# Patient Record
Sex: FEMALE | Race: WHITE | NOT HISPANIC OR LATINO | Employment: UNEMPLOYED | URBAN - NONMETROPOLITAN AREA
[De-identification: names, ages, dates, MRNs, and addresses within clinical notes are randomized per-mention and may not be internally consistent; named-entity substitution may affect disease eponyms.]

---
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
## 2022-05-12 ENCOUNTER — OFFICE VISIT (OUTPATIENT)
Dept: FAMILY MEDICINE CLINIC | Age: 22
End: 2022-05-12

## 2022-05-12 VITALS
HEART RATE: 114 BPM | HEIGHT: 68 IN | WEIGHT: 136.6 LBS | DIASTOLIC BLOOD PRESSURE: 98 MMHG | BODY MASS INDEX: 20.7 KG/M2 | SYSTOLIC BLOOD PRESSURE: 142 MMHG

## 2022-05-12 DIAGNOSIS — R11.2 NAUSEA AND VOMITING, UNSPECIFIED VOMITING TYPE: Primary | ICD-10-CM

## 2022-05-12 PROCEDURE — 99213 OFFICE O/P EST LOW 20 MIN: CPT | Performed by: NURSE PRACTITIONER

## 2022-05-12 RX ORDER — ONDANSETRON 4 MG/1
4 TABLET, ORALLY DISINTEGRATING ORAL EVERY 8 HOURS PRN
Qty: 30 TABLET | Refills: 0 | Status: SHIPPED | OUTPATIENT
Start: 2022-05-12 | End: 2022-08-13 | Stop reason: SDUPTHER

## 2022-05-12 NOTE — PROGRESS NOTES
"Anayeli Lua presents to [facility] with complaint of  Nausea (Lack of appetite x 2 months)    SUBJECTIVE  History of Present Illness    The patient presents today with nausea and decreased appetite. She says she feels full after just taking a few bites. She admits she is not sure if this is subconscious or not, when she has these experiences. She also becomes nauseated when she is having a BM. She says the nausea occurs however before she strains to have BM. She says that she was able to keep down fettuccine jesse yesterday, and a protein bar today. She says she has not been able to eat regular meals for the nausea.     She does say she had diarrhea 2 weeks ago, but none since then. She denies fever, chills, HA, dizziness, syncope, CP, SOA, GERD symptoms, and abdominal pain.    Of note, she has lost 14 lbs since March 2021.     She does express she had possible miscarriage 2 months ago and does not know is this is playing into her nauseasness. She mentions she has started taking multivitamin since she has not been able to eat normally.     The patient says she does have hx of eating disorder that was never diagnosed 3 years ago. She said she would restrict her food/certain foods and would count calories almost obsessively. She says she got down to 110 lbs.     OBJECTIVE  Vital Signs:   /98 (BP Location: Left arm, Patient Position: Sitting)   Pulse 114   Ht 172.7 cm (68\")   Wt 62 kg (136 lb 9.6 oz)   BMI 20.77 kg/m²       Physical Exam  Vitals reviewed.   Constitutional:       General: She is not in acute distress.     Appearance: Normal appearance. She is not ill-appearing.   HENT:      Head: Normocephalic and atraumatic.      Nose: Nose normal.      Mouth/Throat:      Mouth: Mucous membranes are moist.      Pharynx: Oropharynx is clear.   Cardiovascular:      Rate and Rhythm: Normal rate and regular rhythm.      Pulses: Normal pulses.      Heart sounds: Normal heart " sounds.   Pulmonary:      Effort: Pulmonary effort is normal.      Breath sounds: Normal breath sounds.   Abdominal:      General: Bowel sounds are normal.      Palpations: Abdomen is soft. There is no hepatomegaly, splenomegaly or mass.      Tenderness: There is no abdominal tenderness. There is no guarding or rebound. Negative signs include Sosa's sign and McBurney's sign.      Hernia: No hernia is present.   Musculoskeletal:      Cervical back: Neck supple.   Skin:     General: Skin is warm and dry.      Capillary Refill: Capillary refill takes less than 2 seconds.   Neurological:      General: No focal deficit present.      Mental Status: She is alert and oriented to person, place, and time. Mental status is at baseline.   Psychiatric:         Mood and Affect: Mood is anxious.         Speech: Speech is rapid and pressured.         Behavior: Behavior normal.         Judgment: Judgment normal.          ASSESSMENT AND PLAN:  Diagnoses and all orders for this visit:    1. Nausea and vomiting, unspecified vomiting type (Primary)  -     ondansetron ODT (ZOFRAN-ODT) 4 MG disintegrating tablet; Place 1 tablet on the tongue Every 8 (Eight) Hours As Needed for Nausea or Vomiting.  Dispense: 30 tablet; Refill: 0    Discussed with the patient possible causes for her symptoms. I have high suspicion that this may be psychogenic, given her recent stressors and history of eating issues. She does not wish to do any labs today. She would like to try Zofran first along with BRAT diet. She was told to take sips of sprite/ginger ale and water-avoid caffeine, juices, spicy/seasoned foods. She will follow up with PCP in 1 month, if no improvement, she may need further workup to rule out causes.      Follow Up   Return in about 1 month (around 6/12/2022). Patient to notify office with any acute concerns or issues.  Patient verbalizes understanding, agrees with plan of care and has no further questions upon discharge.     Patient was  given instructions and counseling regarding her condition or for health maintenance advice. Please see specific information pulled into the AVS if appropriate.

## 2022-06-20 ENCOUNTER — OFFICE VISIT (OUTPATIENT)
Dept: FAMILY MEDICINE CLINIC | Age: 22
End: 2022-06-20

## 2022-06-20 VITALS
HEART RATE: 97 BPM | SYSTOLIC BLOOD PRESSURE: 130 MMHG | WEIGHT: 132.6 LBS | TEMPERATURE: 98.8 F | DIASTOLIC BLOOD PRESSURE: 80 MMHG | HEIGHT: 68 IN | BODY MASS INDEX: 20.1 KG/M2

## 2022-06-20 DIAGNOSIS — K21.9 GASTROESOPHAGEAL REFLUX DISEASE, UNSPECIFIED WHETHER ESOPHAGITIS PRESENT: ICD-10-CM

## 2022-06-20 DIAGNOSIS — R55 SYNCOPE, UNSPECIFIED SYNCOPE TYPE: ICD-10-CM

## 2022-06-20 DIAGNOSIS — F41.9 ANXIETY: ICD-10-CM

## 2022-06-20 DIAGNOSIS — R63.0 DECREASED APPETITE: ICD-10-CM

## 2022-06-20 DIAGNOSIS — R11.2 NAUSEA AND VOMITING, UNSPECIFIED VOMITING TYPE: Primary | ICD-10-CM

## 2022-06-20 PROCEDURE — 99214 OFFICE O/P EST MOD 30 MIN: CPT | Performed by: NURSE PRACTITIONER

## 2022-06-20 RX ORDER — MIRTAZAPINE 15 MG/1
15 TABLET, FILM COATED ORAL NIGHTLY
Qty: 30 TABLET | Refills: 1 | Status: SHIPPED | OUTPATIENT
Start: 2022-06-20 | End: 2022-11-04

## 2022-06-20 RX ORDER — PANTOPRAZOLE SODIUM 40 MG/1
40 TABLET, DELAYED RELEASE ORAL DAILY
Qty: 90 TABLET | Refills: 1 | Status: SHIPPED | OUTPATIENT
Start: 2022-06-20 | End: 2022-11-04

## 2022-06-20 NOTE — PROGRESS NOTES
"Chief Complaint  Nausea (1 month f/u)    Subjective          Anayeli Lua presents to [facility]  For follow-up regarding nausea.  She was seen in this office approximately 1 month ago by JOSE Ewing.  She was given Zofran.  She states that this medication has helped but she still does not have an appetite.  She feels as if she will pass out at times.  States she is having some symptoms that could be heartburn.  She has abdominal pain when she is nauseous as well.  She states in high school she did limit her calorie intake severely to promote weight loss.  She is not trying to do this now, she is actually trying to gain weight.  She states that the food texture, smell will make her nauseous and then she actually has abdominal pain after she eats.  Denies bulimia.    Patient states she is also concerned about having anxiety.  She has not taken anything for this in the past.  Denies depression.  Denies suicidal ideation.  Objective   Vital Signs:   Vitals:    06/20/22 1514 06/20/22 1516   BP: 111/91 130/80   BP Location: Right arm Left arm   Patient Position: Sitting Sitting   Cuff Size: Adult Adult   Pulse: 91 97   Temp: 98.8 °F (37.1 °C)    TempSrc: Oral    Weight: 60.1 kg (132 lb 9.6 oz)    Height: 172.7 cm (67.99\")        Physical Exam  Vitals reviewed.   Constitutional:       General: She is not in acute distress.     Appearance: Normal appearance. She is well-developed.   HENT:      Head: Normocephalic and atraumatic.   Eyes:      Conjunctiva/sclera: Conjunctivae normal.      Pupils: Pupils are equal, round, and reactive to light.   Cardiovascular:      Rate and Rhythm: Normal rate and regular rhythm.      Heart sounds: Normal heart sounds. No murmur heard.  Pulmonary:      Effort: Pulmonary effort is normal. No respiratory distress.      Breath sounds: Normal breath sounds.   Abdominal:      General: Bowel sounds are normal. There is no distension.      Palpations: " Abdomen is soft. There is no mass.      Tenderness: There is abdominal tenderness.      Hernia: No hernia is present.      Comments: Diffuse abdominal tenderness noted.   Skin:     General: Skin is warm and dry.   Neurological:      Mental Status: She is alert and oriented to person, place, and time.   Psychiatric:         Mood and Affect: Mood and affect normal.         Behavior: Behavior normal.         Thought Content: Thought content normal.         Judgment: Judgment normal.          Result Review :   The following data was reviewed by: JOSE Alonso on 06/20/2022:  Office Visit with Janet Dukes APRN (05/12/2022)           Assessment and Plan    Diagnoses and all orders for this visit:    1. Nausea and vomiting, unspecified vomiting type (Primary)  Assessment & Plan:  Continue Zofran as needed.  Will notify patient of CT results.  Sending to gastro.    Orders:  -     Ambulatory Referral to Gastroenterology  -     CT Abdomen Pelvis With & Without Contrast; Future    2. Syncope, unspecified syncope type  Assessment & Plan:  Will notify patient of results and treat accordingly.  Make sure to eat small meals throughout the day.  Increase water intake.    Orders:  -     CBC Auto Differential; Future  -     Comprehensive Metabolic Panel; Future  -     TSH Rfx On Abnormal To Free T4; Future    3. Gastroesophageal reflux disease, unspecified whether esophagitis present  Assessment & Plan:  Patient to start Protonix daily.    Orders:  -     pantoprazole (Protonix) 40 MG EC tablet; Take 1 tablet by mouth Daily.  Dispense: 90 tablet; Refill: 1  -     Ambulatory Referral to Gastroenterology    4. Decreased appetite  Assessment & Plan:  Starting patient on Remeron every night.  Residual side effects of medication discussed.  Hopefully this will also help with anxiety.  Patient declines counseling at this time.  She does not believe that she has an eating disorder or that her symptoms are related to her  past.    Orders:  -     mirtazapine (Remeron) 15 MG tablet; Take 1 tablet by mouth Every Night.  Dispense: 30 tablet; Refill: 1    5. Anxiety  Assessment & Plan:  Starting Remeron.  Potential side effects medication discussed.  Follow-up in 6 weeks.    Patient to notify office with any acute concerns or issues.  Patient verbalizes understanding, agrees with plan of care and has no further questions upon discharge.    Please note that portions of this note were completed with a voice recognition program.    Follow Up    Return if symptoms worsen or fail to improve.  Patient was given instructions and counseling regarding her condition or for health maintenance advice. Please see specific information pulled into the AVS if appropriate.   Answers for HPI/ROS submitted by the patient on 6/18/2022  Please describe your symptoms.: Constant nausea and low appetite. Had another near fainting spell Saturday night which seems to have been triggered by a panic attack like feeling caused by the back pain. Afterwards I felt like crying but didn’t know why. Zofran helps but causes constipation, all other bowel movements are diarrhea.  Have you had these symptoms before?: Yes  How long have you been having these symptoms?: Greater than 2 weeks  Please list any medications you are currently taking for this condition.: Zofran  Please describe any probable cause for these symptoms. : Possibly anxiety.  What is the primary reason for your visit?: Other

## 2022-06-22 PROBLEM — R63.0 DECREASED APPETITE: Status: ACTIVE | Noted: 2022-06-22

## 2022-06-22 PROBLEM — F41.9 ANXIETY: Status: ACTIVE | Noted: 2022-06-22

## 2022-06-22 NOTE — ASSESSMENT & PLAN NOTE
Will notify patient of results and treat accordingly.  Make sure to eat small meals throughout the day.  Increase water intake.

## 2022-06-22 NOTE — ASSESSMENT & PLAN NOTE
Starting patient on Remeron every night.  Residual side effects of medication discussed.  Hopefully this will also help with anxiety.  Patient declines counseling at this time.  She does not believe that she has an eating disorder or that her symptoms are related to her past.

## 2022-07-06 ENCOUNTER — PATIENT MESSAGE (OUTPATIENT)
Dept: FAMILY MEDICINE CLINIC | Age: 22
End: 2022-07-06

## 2022-07-13 ENCOUNTER — TELEPHONE (OUTPATIENT)
Dept: FAMILY MEDICINE CLINIC | Age: 22
End: 2022-07-13

## 2022-07-28 ENCOUNTER — HOSPITAL ENCOUNTER (OUTPATIENT)
Dept: CT IMAGING | Facility: HOSPITAL | Age: 22
Discharge: HOME | End: 2022-07-28
Admitting: NURSE PRACTITIONER

## 2022-07-28 DIAGNOSIS — R11.2 NAUSEA AND VOMITING, UNSPECIFIED VOMITING TYPE: ICD-10-CM

## 2022-07-28 PROCEDURE — 0 DIATRIZOATE MEGLUMINE & SODIUM PER 1 ML: Performed by: NURSE PRACTITIONER

## 2022-07-28 PROCEDURE — 25010000002 IOPAMIDOL 61 % SOLUTION: Performed by: NURSE PRACTITIONER

## 2022-07-28 PROCEDURE — 74177 CT ABD & PELVIS W/CONTRAST: CPT

## 2022-07-28 RX ADMIN — DIATRIZOATE MEGLUMINE AND DIATRIZOATE SODIUM 30 ML: 660; 100 LIQUID ORAL; RECTAL at 16:50

## 2022-07-28 RX ADMIN — IOPAMIDOL 100 ML: 612 INJECTION, SOLUTION INTRAVENOUS at 17:50

## 2022-08-13 DIAGNOSIS — R11.2 NAUSEA AND VOMITING, UNSPECIFIED VOMITING TYPE: ICD-10-CM

## 2022-08-15 RX ORDER — ONDANSETRON 4 MG/1
4 TABLET, ORALLY DISINTEGRATING ORAL EVERY 8 HOURS PRN
Qty: 30 TABLET | Refills: 0 | Status: SHIPPED | OUTPATIENT
Start: 2022-08-15 | End: 2022-11-21

## 2022-10-25 ENCOUNTER — PATIENT MESSAGE (OUTPATIENT)
Dept: FAMILY MEDICINE CLINIC | Age: 22
End: 2022-10-25

## 2022-10-27 NOTE — TELEPHONE ENCOUNTER
From: Anayeli Lua  To: JOSE Alonso  Sent: 10/25/2022 7:39 PM EDT  Subject: Question about Ovarian Cysts    Hi,  Since my last visit and CT scan, I have continued to  lose weight due to lack of appetite and have been  experiencing pain in my lower back and cramps  outside of my period that are increasing in  frequency. I am going to work with a nutritionist  on campus to address the weight loss but I am still  unsure if it will help. After doing some reading and  speaking to a family friend, I am concerned about  ovarian cancer. Do the cysts give me a  predisposition to ovarian cancer or is this  something I should not be concerned about?

## 2022-11-04 ENCOUNTER — OFFICE VISIT (OUTPATIENT)
Dept: FAMILY MEDICINE CLINIC | Age: 22
End: 2022-11-04

## 2022-11-04 VITALS
OXYGEN SATURATION: 100 % | BODY MASS INDEX: 18.52 KG/M2 | SYSTOLIC BLOOD PRESSURE: 125 MMHG | HEART RATE: 99 BPM | DIASTOLIC BLOOD PRESSURE: 80 MMHG | WEIGHT: 122.2 LBS | HEIGHT: 68 IN | TEMPERATURE: 98.5 F

## 2022-11-04 DIAGNOSIS — N93.9 VAGINAL BLEEDING: ICD-10-CM

## 2022-11-04 DIAGNOSIS — M54.50 ACUTE LOW BACK PAIN, UNSPECIFIED BACK PAIN LATERALITY, UNSPECIFIED WHETHER SCIATICA PRESENT: ICD-10-CM

## 2022-11-04 DIAGNOSIS — R10.9 ABDOMINAL CRAMPING: ICD-10-CM

## 2022-11-04 DIAGNOSIS — M54.50 ACUTE LOW BACK PAIN, UNSPECIFIED BACK PAIN LATERALITY, UNSPECIFIED WHETHER SCIATICA PRESENT: Primary | ICD-10-CM

## 2022-11-04 LAB
B-HCG UR QL: NEGATIVE
EXPIRATION DATE: NORMAL
INTERNAL NEGATIVE CONTROL: NORMAL
INTERNAL POSITIVE CONTROL: NORMAL
Lab: NORMAL

## 2022-11-04 PROCEDURE — 81025 URINE PREGNANCY TEST: CPT

## 2022-11-04 PROCEDURE — 99213 OFFICE O/P EST LOW 20 MIN: CPT

## 2022-11-04 NOTE — PROGRESS NOTES
"Subjective     CHIEF COMPLAINT    Chief Complaint   Patient presents with   • Back Pain     Lower back pain and Vaginal bleeding. Patient states she \"might be having a miscarriage.\"     History of Present Illness  Patient is a 22-year-old female who presents to the clinic today with complaints of lower back pain and vaginal bleeding.  Her symptoms began last night.  She also endorses abdominal cramping.  She does note some clots in the vaginal bleeding this morning.  Her concern is that she may be having a miscarriage as her symptoms feel similar to a previous miscarriage.  First day of her last menstrual period was 10-8-2022.  She is not confirmed to be pregnant but is having unprotected sex.  She does not follow routinely with OB.  She does endorse some nausea but denies vomiting.    Review of Systems   Constitutional: Negative for chills and fever.   Gastrointestinal: Positive for abdominal pain and nausea. Negative for vomiting.   Genitourinary: Positive for vaginal bleeding. Negative for dysuria.   Musculoskeletal: Positive for back pain.     History reviewed. No pertinent surgical history.      No Known Allergies      Current Outpatient Medications on File Prior to Visit   Medication Sig Dispense Refill   • ondansetron ODT (ZOFRAN-ODT) 4 MG disintegrating tablet Place 1 tablet on the tongue Every 8 (Eight) Hours As Needed for Nausea or Vomiting. 30 tablet 0   • [DISCONTINUED] mirtazapine (Remeron) 15 MG tablet Take 1 tablet by mouth Every Night. 30 tablet 1   • [DISCONTINUED] pantoprazole (Protonix) 40 MG EC tablet Take 1 tablet by mouth Daily. 90 tablet 1     No current facility-administered medications on file prior to visit.     /80 (BP Location: Left arm, Patient Position: Sitting, Cuff Size: Adult)   Pulse 99   Temp 98.5 °F (36.9 °C) (Oral)   Ht 172.7 cm (67.99\")   Wt 55.4 kg (122 lb 3.2 oz)   SpO2 100%   BMI 18.59 kg/m²     Objective     Physical Exam  Vitals and nursing note reviewed. "   Constitutional:       General: She is not in acute distress.     Appearance: Normal appearance. She is not ill-appearing.   Cardiovascular:      Rate and Rhythm: Normal rate and regular rhythm.      Heart sounds: Normal heart sounds. No murmur heard.  Pulmonary:      Effort: Pulmonary effort is normal. No respiratory distress.      Breath sounds: Normal breath sounds. No wheezing or rhonchi.   Abdominal:      General: Bowel sounds are normal. There is no distension.      Palpations: Abdomen is soft.      Tenderness: There is abdominal tenderness in the suprapubic area. There is no right CVA tenderness, left CVA tenderness, guarding or rebound.   Neurological:      General: No focal deficit present.      Mental Status: She is alert and oriented to person, place, and time.   Psychiatric:         Mood and Affect: Affect normal. Mood is anxious.         Speech: Speech normal.          Results for orders placed or performed in visit on 11/04/22   POCT pregnancy, urine    Specimen: Urine   Result Value Ref Range    HCG, Urine, QL Negative Negative    Lot Number [id number]     Internal Positive Control Passed Positive, Passed    Internal Negative Control Passed Negative, Passed    Expiration Date 12,312,023           Diagnoses and all orders for this visit:    1. Acute low back pain, unspecified back pain laterality, unspecified whether sciatica present (Primary)  -     Cancel: Urinalysis With Microscopic - Urine, Clean Catch; Future  -     POCT pregnancy, urine    2. Vaginal bleeding    3. Abdominal cramping      UA cancelled, entered in error.     Given patients complaints and concerns, she will be going to ER for further evaluation.  Declines EMS. Her boyfriend is with her and will drive her to the ER. Report called to [facility]. Patient voiced understanding of all instructions and had no further questions at this time.

## 2022-11-21 ENCOUNTER — OFFICE VISIT (OUTPATIENT)
Dept: FAMILY MEDICINE CLINIC | Age: 22
End: 2022-11-21

## 2022-11-21 VITALS
HEIGHT: 68 IN | HEART RATE: 99 BPM | SYSTOLIC BLOOD PRESSURE: 111 MMHG | DIASTOLIC BLOOD PRESSURE: 73 MMHG | WEIGHT: 123.2 LBS | TEMPERATURE: 98 F | BODY MASS INDEX: 18.67 KG/M2

## 2022-11-21 DIAGNOSIS — N63.0 BREAST NODULE: ICD-10-CM

## 2022-11-21 DIAGNOSIS — N64.4 MASTALGIA IN FEMALE: Primary | ICD-10-CM

## 2022-11-21 PROCEDURE — 99213 OFFICE O/P EST LOW 20 MIN: CPT | Performed by: NURSE PRACTITIONER

## 2022-11-21 NOTE — PROGRESS NOTES
"Chief Complaint  Breast Mass (Left, \"I have had breast pain for a month but noticed it swelling about 2 weeks ago\")    Subjective          Anayeli Lua presents to [facility]  Complaining of left upper outer and underarm pain x1 month.  She states that she noticed it swelling approximately 2 weeks ago.  She has not noticed a correlation between her cycle.  She also noticed a small darker lesion on the underside of her right breast.  Denies family history of breast cancer.    Objective   Vital Signs:   Vitals:    11/21/22 1311   BP: 111/73   BP Location: Left arm   Patient Position: Sitting   Pulse: 99   Temp: 98 °F (36.7 °C)   TempSrc: Oral   Weight: 55.9 kg (123 lb 3.2 oz)   Height: 172.7 cm (67.99\")       Physical Exam  Vitals reviewed.   Constitutional:       General: She is not in acute distress.     Appearance: Normal appearance. She is well-developed.   Pulmonary:      Effort: Pulmonary effort is normal. No respiratory distress.   Chest:   Breasts:     Breasts are symmetrical.      Right: No inverted nipple or nipple discharge.      Left: No inverted nipple or nipple discharge.       Lymphadenopathy:      Upper Body:      Right upper body: Axillary adenopathy present.      Left upper body: No axillary adenopathy.   Skin:     General: Skin is warm and dry.   Neurological:      Mental Status: She is alert and oriented to person, place, and time.   Psychiatric:         Mood and Affect: Mood and affect normal.         Behavior: Behavior normal.         Thought Content: Thought content normal.         Judgment: Judgment normal.          Result Review :              Assessment and Plan    Diagnoses and all orders for this visit:    1. Mastalgia in female (Primary)  -     Mammo Diagnostic Digital Tomosynthesis Bilateral With CAD; Future  -     US Breast Left Limited; Future    2. Breast nodule  -     Mammo Diagnostic Digital Tomosynthesis Bilateral With CAD; Future  -     US Breast " Right Limited; Future    Will notify patient of results and treat accordingly.  Patient to notify office with any acute concerns or issues.  Patient verbalizes understanding, agrees with plan of care and has no further questions upon discharge.    Please note that portions of this note were completed with a voice recognition program.    Follow Up    Return if symptoms worsen or fail to improve.  Patient was given instructions and counseling regarding her condition or for health maintenance advice. Please see specific information pulled into the AVS if appropriate.

## 2022-12-05 ENCOUNTER — HOSPITAL ENCOUNTER (OUTPATIENT)
Dept: MAMMOGRAPHY | Facility: HOSPITAL | Age: 22
Discharge: HOME | End: 2022-12-05

## 2022-12-05 ENCOUNTER — ANCILLARY ORDERS (OUTPATIENT)
Dept: FAMILY MEDICINE CLINIC | Age: 22
End: 2022-12-05

## 2022-12-05 ENCOUNTER — HOSPITAL ENCOUNTER (OUTPATIENT)
Dept: ULTRASOUND IMAGING | Facility: HOSPITAL | Age: 22
Discharge: HOME | End: 2022-12-05

## 2022-12-05 ENCOUNTER — APPOINTMENT (OUTPATIENT)
Dept: ULTRASOUND IMAGING | Facility: HOSPITAL | Age: 22
End: 2022-12-05

## 2022-12-05 DIAGNOSIS — L72.3 SEBACEOUS CYST OF AXILLA: Primary | ICD-10-CM

## 2022-12-05 DIAGNOSIS — N64.4 MASTALGIA IN FEMALE: ICD-10-CM

## 2022-12-05 DIAGNOSIS — N63.0 BREAST NODULE: ICD-10-CM

## 2022-12-05 PROCEDURE — 76642 ULTRASOUND BREAST LIMITED: CPT

## 2022-12-07 ENCOUNTER — OFFICE VISIT (OUTPATIENT)
Dept: FAMILY MEDICINE CLINIC | Age: 22
End: 2022-12-07

## 2022-12-07 VITALS
TEMPERATURE: 98.4 F | HEIGHT: 68 IN | WEIGHT: 125 LBS | HEART RATE: 83 BPM | BODY MASS INDEX: 18.94 KG/M2 | OXYGEN SATURATION: 98 % | DIASTOLIC BLOOD PRESSURE: 64 MMHG | SYSTOLIC BLOOD PRESSURE: 105 MMHG

## 2022-12-07 DIAGNOSIS — R10.84 GENERALIZED ABDOMINAL PAIN: Primary | ICD-10-CM

## 2022-12-07 DIAGNOSIS — K30 INDIGESTION: ICD-10-CM

## 2022-12-07 DIAGNOSIS — R11.0 NAUSEA: ICD-10-CM

## 2022-12-07 DIAGNOSIS — R30.0 DYSURIA: ICD-10-CM

## 2022-12-07 LAB
BILIRUB BLD-MCNC: NEGATIVE MG/DL
CLARITY, POC: ABNORMAL
COLOR UR: ABNORMAL
EXPIRATION DATE: ABNORMAL
GLUCOSE UR STRIP-MCNC: NEGATIVE MG/DL
KETONES UR QL: NEGATIVE
LEUKOCYTE EST, POC: NEGATIVE
Lab: ABNORMAL
NITRITE UR-MCNC: NEGATIVE MG/ML
PH UR: 8 [PH] (ref 5–8)
PROT UR STRIP-MCNC: ABNORMAL MG/DL
RBC # UR STRIP: ABNORMAL /UL
SP GR UR: 1.02 (ref 1–1.03)
UROBILINOGEN UR QL: ABNORMAL

## 2022-12-07 PROCEDURE — 81003 URINALYSIS AUTO W/O SCOPE: CPT | Performed by: NURSE PRACTITIONER

## 2022-12-07 PROCEDURE — 99213 OFFICE O/P EST LOW 20 MIN: CPT | Performed by: NURSE PRACTITIONER

## 2022-12-07 RX ORDER — OMEPRAZOLE 20 MG/1
20 CAPSULE, DELAYED RELEASE ORAL DAILY
Qty: 20 CAPSULE | Refills: 0 | Status: SHIPPED | OUTPATIENT
Start: 2022-12-07 | End: 2022-12-19

## 2022-12-07 RX ORDER — ONDANSETRON 4 MG/1
4 TABLET, ORALLY DISINTEGRATING ORAL EVERY 8 HOURS PRN
Qty: 15 TABLET | Refills: 1 | Status: SHIPPED | OUTPATIENT
Start: 2022-12-07

## 2022-12-07 RX ORDER — SUCRALFATE 1 G/1
1 TABLET ORAL 4 TIMES DAILY
Qty: 40 TABLET | Refills: 0 | Status: SHIPPED | OUTPATIENT
Start: 2022-12-07 | End: 2023-02-13

## 2022-12-07 NOTE — PROGRESS NOTES
"Chief Complaint  Abdominal Pain (Stomach pain, could hardly walk around, started yesterday and is getting better today )    Subjective        Anayeli Lua presents to [facility]  Abdominal Pain  This is a recurrent problem. The current episode started yesterday. The problem occurs intermittently. The problem has been gradually improving since onset. The pain is located in the generalized abdominal region. The pain is at a severity of 7/10. The quality of the pain is described as burning and sharp. The pain radiates to the back. Associated symptoms include diarrhea, dysuria and nausea. Pertinent negatives include no constipation, fever or frequency. The symptoms are relieved by standing. Past treatments include nothing. The treatment provided mild relief.   LMP 12/5/2022    Objective   Vital Signs:  /64 (BP Location: Right arm, Patient Position: Sitting)   Pulse 83   Temp 98.4 °F (36.9 °C) (Oral)   Ht 172.7 cm (67.99\")   Wt 56.7 kg (125 lb)   SpO2 98%   BMI 19.01 kg/m²   Estimated body mass index is 19.01 kg/m² as calculated from the following:    Height as of this encounter: 172.7 cm (67.99\").    Weight as of this encounter: 56.7 kg (125 lb).    BMI is within normal parameters. No other follow-up for BMI required.      Physical Exam  HENT:      Head: Normocephalic.   Cardiovascular:      Rate and Rhythm: Normal rate.   Pulmonary:      Effort: Pulmonary effort is normal.   Abdominal:      General: Abdomen is flat. Bowel sounds are normal.      Palpations: Abdomen is soft.      Tenderness: There is no abdominal tenderness. There is no rebound.   Skin:     General: Skin is warm and dry.   Neurological:      Mental Status: She is alert and oriented to person, place, and time.   Psychiatric:         Mood and Affect: Mood normal.        Result Review :                 Results for orders placed or performed in visit on 12/07/22   POCT urinalysis dipstick, automated    Specimen: " Urine   Result Value Ref Range    Color Dark Yellow Yellow, Straw, Dark Yellow, Misa    Clarity, UA Slightly Cloudy (A) Clear    Specific Gravity  1.020 1.005 - 1.030    pH, Urine 8.0 5.0 - 8.0    Leukocytes Negative Negative    Nitrite, UA Negative Negative    Protein,  mg/dL (A) Negative mg/dL    Glucose, UA Negative Negative mg/dL    Ketones, UA Negative Negative    Urobilinogen, UA 0.2 E.U./dL Normal, 0.2 E.U./dL    Bilirubin Negative Negative    Blood, UA Moderate (A) Negative    Lot Number [id number]     Expiration Date 8.30.2023        Assessment and Plan   Diagnoses and all orders for this visit:    1. Generalized abdominal pain (Primary)  Comments:  Improved today. Will treat for mild gastritis, follow up in one week if no improvement for GI referral and possible scope  Orders:  -     omeprazole (priLOSEC) 20 MG capsule; Take 1 capsule by mouth Daily.  Dispense: 20 capsule; Refill: 0  -     sucralfate (Carafate) 1 g tablet; Take 1 tablet by mouth 4 (Four) Times a Day.  Dispense: 40 tablet; Refill: 0    2. Nausea  -     ondansetron ODT (ZOFRAN-ODT) 4 MG disintegrating tablet; Place 1 tablet on the tongue Every 8 (Eight) Hours As Needed for Nausea or Vomiting.  Dispense: 15 tablet; Refill: 1    3. Indigestion  -     omeprazole (priLOSEC) 20 MG capsule; Take 1 capsule by mouth Daily.  Dispense: 20 capsule; Refill: 0  -     H. Pylori Breath Test - Breath, Lung; Future    4. Dysuria  -     POCT urinalysis dipstick, automated             Follow Up {Instructions Charge Capture  Follow-up Communications :23}  Return if symptoms worsen or fail to improve.  Patient was given instructions and counseling regarding her condition or for health maintenance advice. Please see specific information pulled into the AVS if appropriate.

## 2022-12-15 ENCOUNTER — TELEPHONE (OUTPATIENT)
Dept: FAMILY MEDICINE CLINIC | Age: 22
End: 2022-12-15

## 2022-12-19 DIAGNOSIS — K30 INDIGESTION: Primary | ICD-10-CM

## 2022-12-19 RX ORDER — PANTOPRAZOLE SODIUM 40 MG/1
40 TABLET, DELAYED RELEASE ORAL DAILY
Qty: 20 TABLET | Refills: 0 | Status: SHIPPED | OUTPATIENT
Start: 2022-12-19 | End: 2023-02-13

## 2022-12-24 DIAGNOSIS — R10.84 GENERALIZED ABDOMINAL PAIN: ICD-10-CM

## 2022-12-27 RX ORDER — SUCRALFATE 1 G/1
TABLET ORAL
Qty: 40 TABLET | Refills: 0 | OUTPATIENT
Start: 2022-12-27

## 2023-01-04 ENCOUNTER — TELEPHONE (OUTPATIENT)
Dept: FAMILY MEDICINE CLINIC | Age: 23
End: 2023-01-04
Payer: COMMERCIAL

## 2023-01-04 NOTE — TELEPHONE ENCOUNTER
Received fax from pharmacy stating pantoprazole 40mg tablets not covered by insurance, per [place] check w/ pt to see if she was able to  omeprazole and if it was helpful, lmtrc.

## 2023-02-13 ENCOUNTER — LAB (OUTPATIENT)
Dept: LAB | Facility: HOSPITAL | Age: 23
End: 2023-02-13
Payer: COMMERCIAL

## 2023-02-13 ENCOUNTER — OFFICE VISIT (OUTPATIENT)
Dept: FAMILY MEDICINE CLINIC | Age: 23
End: 2023-02-13
Payer: COMMERCIAL

## 2023-02-13 VITALS
SYSTOLIC BLOOD PRESSURE: 101 MMHG | BODY MASS INDEX: 18.68 KG/M2 | DIASTOLIC BLOOD PRESSURE: 73 MMHG | HEART RATE: 93 BPM | OXYGEN SATURATION: 100 % | TEMPERATURE: 97.9 F | WEIGHT: 123.25 LBS | HEIGHT: 68 IN

## 2023-02-13 DIAGNOSIS — N93.9 VAGINAL BLEEDING: ICD-10-CM

## 2023-02-13 DIAGNOSIS — N92.6 IRREGULAR MENSES: ICD-10-CM

## 2023-02-13 DIAGNOSIS — N83.201 CYST OF RIGHT OVARY: ICD-10-CM

## 2023-02-13 DIAGNOSIS — R30.0 DYSURIA: Primary | ICD-10-CM

## 2023-02-13 LAB
B-HCG UR QL: NEGATIVE
BACTERIA UR QL AUTO: ABNORMAL /HPF
BASOPHILS # BLD AUTO: 0.05 10*3/MM3 (ref 0–0.2)
BASOPHILS NFR BLD AUTO: 0.5 % (ref 0–1.5)
BILIRUB UR QL STRIP: NEGATIVE
CLARITY UR: CLEAR
COLOR UR: YELLOW
DEPRECATED RDW RBC AUTO: 38.6 FL (ref 37–54)
EOSINOPHIL # BLD AUTO: 0.06 10*3/MM3 (ref 0–0.4)
EOSINOPHIL NFR BLD AUTO: 0.6 % (ref 0.3–6.2)
ERYTHROCYTE [DISTWIDTH] IN BLOOD BY AUTOMATED COUNT: 11.9 % (ref 12.3–15.4)
GLUCOSE UR STRIP-MCNC: NEGATIVE MG/DL
HCT VFR BLD AUTO: 41.7 % (ref 34–46.6)
HGB BLD-MCNC: 13.8 G/DL (ref 12–15.9)
HGB UR QL STRIP.AUTO: ABNORMAL
IMM GRANULOCYTES # BLD AUTO: 0.02 10*3/MM3 (ref 0–0.05)
IMM GRANULOCYTES NFR BLD AUTO: 0.2 % (ref 0–0.5)
KETONES UR QL STRIP: NEGATIVE
LEUKOCYTE ESTERASE UR QL STRIP.AUTO: NEGATIVE
LYMPHOCYTES # BLD AUTO: 3.25 10*3/MM3 (ref 0.7–3.1)
LYMPHOCYTES NFR BLD AUTO: 33.4 % (ref 19.6–45.3)
MCH RBC QN AUTO: 29.1 PG (ref 26.6–33)
MCHC RBC AUTO-ENTMCNC: 33.1 G/DL (ref 31.5–35.7)
MCV RBC AUTO: 87.8 FL (ref 79–97)
MONOCYTES # BLD AUTO: 0.58 10*3/MM3 (ref 0.1–0.9)
MONOCYTES NFR BLD AUTO: 6 % (ref 5–12)
NEUTROPHILS NFR BLD AUTO: 5.78 10*3/MM3 (ref 1.7–7)
NEUTROPHILS NFR BLD AUTO: 59.3 % (ref 42.7–76)
NITRITE UR QL STRIP: NEGATIVE
PH UR STRIP.AUTO: 7 [PH] (ref 5–8)
PLATELET # BLD AUTO: 412 10*3/MM3 (ref 140–450)
PMV BLD AUTO: 9 FL (ref 6–12)
PROT UR QL STRIP: ABNORMAL
RBC # BLD AUTO: 4.75 10*6/MM3 (ref 3.77–5.28)
RBC # UR STRIP: ABNORMAL /HPF
REF LAB TEST METHOD: ABNORMAL
SP GR UR STRIP: 1.02 (ref 1–1.03)
SQUAMOUS #/AREA URNS HPF: ABNORMAL /HPF
UROBILINOGEN UR QL STRIP: ABNORMAL
WBC # UR STRIP: ABNORMAL /HPF
WBC NRBC COR # BLD: 9.74 10*3/MM3 (ref 3.4–10.8)

## 2023-02-13 PROCEDURE — 81025 URINE PREGNANCY TEST: CPT

## 2023-02-13 PROCEDURE — 81001 URINALYSIS AUTO W/SCOPE: CPT

## 2023-02-13 PROCEDURE — 87086 URINE CULTURE/COLONY COUNT: CPT

## 2023-02-13 PROCEDURE — 36415 COLL VENOUS BLD VENIPUNCTURE: CPT

## 2023-02-13 PROCEDURE — 85025 COMPLETE CBC W/AUTO DIFF WBC: CPT

## 2023-02-13 PROCEDURE — 99213 OFFICE O/P EST LOW 20 MIN: CPT

## 2023-02-13 NOTE — PROGRESS NOTES
Subjective     CHIEF COMPLAINT    Chief Complaint   Patient presents with   • Urinary Frequency   • Blood in Urine     Pt c/o  bleeding since 2/3/23     History of Present Illness  Patient is a 23-year-old female who presents to the clinic today with complaints of urinary frequency and dysuria. She had a fever of 100 degrees on Saturday.  She went to the urgent care and was told to go to the ER because their concern was appendicitis.  She did not go to the ER.    She is also having vaginal bleeding and is concerned because of the amount of bleeding.  Does have a history of some irregular menses and has not been worked up for this.  She reports that she had a period for about 3 to 4 days on 1-.  She then began bleeding again on 2-3-2023 and has continued to have some bleeding.  Denies any concerns for pregnancy, home pregnancy test was negative on Saturday.  Did have some back pain.  Denies any history of kidney stones.  Describes her abdominal pain as cramping.  Has had a history of ovarian cysts.       Review of Systems   Constitutional: Positive for fever. Negative for chills.   Gastrointestinal: Negative for nausea and vomiting.   Genitourinary: Positive for dysuria, frequency, menstrual problem and vaginal bleeding. Negative for difficulty urinating, flank pain, hematuria, urgency, vaginal discharge and vaginal pain.   Musculoskeletal: Positive for back pain.       No Known Allergies      Current Outpatient Medications on File Prior to Visit   Medication Sig Dispense Refill   • lisdexamfetamine (VYVANSE) 40 MG capsule Take 40 mg by mouth Daily As Needed     • ondansetron ODT (ZOFRAN-ODT) 4 MG disintegrating tablet Place 1 tablet on the tongue Every 8 (Eight) Hours As Needed for Nausea or Vomiting. 15 tablet 1     No current facility-administered medications on file prior to visit.     /73 (BP Location: Left arm, Patient Position: Sitting, Cuff Size: Adult)   Pulse 93   Temp 97.9 °F (36.6 °C)   Ht  "172.7 cm (67.99\")   Wt 55.9 kg (123 lb 4 oz)   SpO2 100%   BMI 18.74 kg/m²     Objective     Physical Exam  Vitals and nursing note reviewed.   Constitutional:       General: She is not in acute distress.     Appearance: Normal appearance. She is not ill-appearing.   HENT:      Head: Normocephalic.      Nose: Nose normal.      Mouth/Throat:      Lips: Pink.   Eyes:      Extraocular Movements: Extraocular movements intact.   Cardiovascular:      Rate and Rhythm: Normal rate and regular rhythm.      Heart sounds: Normal heart sounds. No murmur heard.  Pulmonary:      Effort: Pulmonary effort is normal. No accessory muscle usage or respiratory distress.      Breath sounds: Normal breath sounds. No wheezing or rhonchi.   Abdominal:      General: Bowel sounds are normal. There is no distension.      Palpations: Abdomen is soft.      Tenderness: There is no abdominal tenderness. There is right CVA tenderness and left CVA tenderness. There is no guarding or rebound.      Comments: Mild flank tenderness bilaterally    Musculoskeletal:      Cervical back: Normal range of motion.   Skin:     General: Skin is warm and dry.   Neurological:      General: No focal deficit present.      Mental Status: She is alert and oriented to person, place, and time.      Gait: Gait is intact.   Psychiatric:         Mood and Affect: Mood and affect normal.         Speech: Speech normal.         Behavior: Behavior normal.          Lab Results (last 24 hours)     Procedure Component Value Units Date/Time    Urine Culture - Urine, Urine, Clean Catch [[id number]]  (Normal) Collected: 02/13/23 1326    Specimen: Urine, Clean Catch Updated: 02/14/23 0642     Urine Culture No growth    Urinalysis With Microscopic - Urine, Clean Catch [[id number]]  (Abnormal) Collected: 02/13/23 1326    Specimen: Urine, Clean Catch Updated: 02/13/23 1351    Narrative:      The following orders were created for panel order Urinalysis With Microscopic - Urine, Clean " Catch.  Procedure                               Abnormality         Status                     ---------                               -----------         ------                     Urinalysis without micro...[[id number]]  Abnormal            Final result               Urinalysis, Microscopic ...[[id number]]  Abnormal            Final result                 Please view results for these tests on the individual orders.    Urinalysis without microscopic (no culture) - Urine, Clean Catch [[id number]]  (Abnormal) Collected: 02/13/23 1326    Specimen: Urine, Clean Catch Updated: 02/13/23 1342     Color, UA Yellow     Appearance, UA Clear     pH, UA 7.0     Specific Gravity, UA 1.020     Glucose, UA Negative     Ketones, UA Negative     Bilirubin, UA Negative     Blood, UA Large (3+)     Protein,  mg/dL (2+)     Leuk Esterase, UA Negative     Nitrite, UA Negative     Urobilinogen, UA 0.2 E.U./dL    Urinalysis, Microscopic Only - Urine, Clean Catch [[id number]]  (Abnormal) Collected: 02/13/23 1326    Specimen: Urine, Clean Catch Updated: 02/13/23 1351     RBC, UA 0-2 /HPF      WBC, UA None Seen /HPF      Bacteria, UA None Seen /HPF      Squamous Epithelial Cells, UA 3-6 /HPF      Methodology Manual Light Microscopy    Pregnancy, Urine - Urine, Clean Catch [[id number]]  (Normal) Collected: 02/13/23 1326    Specimen: Urine, Clean Catch Updated: 02/13/23 1343     HCG, Urine QL Negative    Narrative:      Sensitive immunoassays may demonstrate false positive results  with specimens containing heterophilic antibodies. Assays may  also exhibit false-positive or false-negative results with  specimens containing human anti-mouse antibodies. These   specimens may come from patients receving preparations of  mouse monoclonal antibodies for diagnosis or therapy or having  been exposed to mice. If the qualitative interpretation is  inconsistent with the clinical evaluation, results should be   confirmed by an alternate hCG method,  ie. quantitative hCG.  As with all urine pregnancy test, this test does not reliably  detect hCG degradation products, including free-beta hCG and  beta core fragments.    CBC w AUTO Differential [[id number]]  (Abnormal) Collected: 02/13/23 1426    Specimen: Blood Updated: 02/13/23 1432    Narrative:      The following orders were created for panel order CBC w AUTO Differential.  Procedure                               Abnormality         Status                     ---------                               -----------         ------                     CBC Auto Differential[[id number]]        Abnormal            Final result                 Please view results for these tests on the individual orders.    CBC Auto Differential [[id number]]  (Abnormal) Collected: 02/13/23 1426    Specimen: Blood Updated: 02/13/23 1432     WBC 9.74 10*3/mm3      RBC 4.75 10*6/mm3      Hemoglobin 13.8 g/dL      Hematocrit 41.7 %      MCV 87.8 fL      MCH 29.1 pg      MCHC 33.1 g/dL      RDW 11.9 %      RDW-SD 38.6 fl      MPV 9.0 fL      Platelets 412 10*3/mm3      Neutrophil % 59.3 %      Lymphocyte % 33.4 %      Monocyte % 6.0 %      Eosinophil % 0.6 %      Basophil % 0.5 %      Immature Grans % 0.2 %      Neutrophils, Absolute 5.78 10*3/mm3      Lymphocytes, Absolute 3.25 10*3/mm3      Monocytes, Absolute 0.58 10*3/mm3      Eosinophils, Absolute 0.06 10*3/mm3      Basophils, Absolute 0.05 10*3/mm3      Immature Grans, Absolute 0.02 10*3/mm3            Diagnoses and all orders for this visit:    1. Dysuria (Primary)  -     Urine Culture - Urine, Urine, Clean Catch; Future  -     Urinalysis With Microscopic - Urine, Clean Catch; Future  -     Urine Culture - Urine, Urine, Clean Catch  -     Urinalysis With Microscopic - Urine, Clean Catch    2. Irregular menses  Comments:  Negative pregnancy test.   Orders:  -     Cancel: POCT pregnancy, urine  -     Pregnancy, Urine - Urine, Clean Catch; Future  -     Pregnancy, Urine - Urine, Clean  Catch  -     Ambulatory Referral to Obstetrics / Gynecology    3. Vaginal bleeding  -     Cancel: CBC w AUTO Differential; Future  -     US Non-ob Transvaginal; Future  -     CBC w AUTO Differential; Future  -     Ambulatory Referral to Obstetrics / Gynecology    4. Cyst of right ovary  -     Ambulatory Referral to Obstetrics / Gynecology      Urine is not concerning for UTI at this time but will send for culture and treat accordingly. Discussed this case with on call physician, Dr. Warren who advised on treatment plan. Will order stat TVUS for further assessment due to vaginal bleeding. Will also check CBC. No red flags on assessment today. Patient will likely need referral to OBGYN for further evaluation.     Patient educated extensively for any worsening pain/bleeding, fever, chills, nausea, vomiting or other symptoms, she is to proceed to ER. Patient voiced understanding of all instructions and had no further questions at this time.     Addendum: Notified patient via telephone of CBC results. No concerning findings, WBC and Hgb normal. Informed patient we are still awaiting US results, will notify her when available. Again reiterated ER precautions to patient and she voiced understanding.     Ultrasound showed cystic lesion of ovary, likely a hemorrhagic cyst. Recommended follow up US in 3-6 months. Will send referral to OBGYN to follow this and regarding abnormal vaginal bleeding. Patient also due to begin pap smears.      Follow up:  Return if symptoms worsen or fail to improve.  Patient was given instructions and counseling regarding her condition or for health maintenance advice. Please see specific information pulled into the AVS if appropriate.

## 2023-02-14 DIAGNOSIS — N93.9 VAGINAL BLEEDING: ICD-10-CM

## 2023-02-14 LAB — BACTERIA SPEC AEROBE CULT: NO GROWTH

## 2023-03-09 ENCOUNTER — TELEMEDICINE (OUTPATIENT)
Dept: FAMILY MEDICINE CLINIC | Age: 23
End: 2023-03-09
Payer: COMMERCIAL

## 2023-03-09 ENCOUNTER — TELEPHONE (OUTPATIENT)
Dept: FAMILY MEDICINE CLINIC | Age: 23
End: 2023-03-09

## 2023-03-09 DIAGNOSIS — B34.9 VIRAL INFECTION: Primary | ICD-10-CM

## 2023-03-09 PROCEDURE — 99213 OFFICE O/P EST LOW 20 MIN: CPT | Performed by: NURSE PRACTITIONER

## 2023-03-09 RX ORDER — NORGESTIMATE AND ETHINYL ESTRADIOL 0.25-0.035
KIT ORAL DAILY
COMMUNITY
Start: 2023-02-16

## 2023-03-09 NOTE — PROGRESS NOTES
Chief Complaint  Anayeli Lua presents to [facility] for Fatigue (Fatigue, body aches, chills, X 2 days/Video visit: [phone])    Subjective          History of Present Illness    Today's encounter is being done with a telehealth visit. Pt has consented verbally with two witnesses for todays treatment. Todays visit is being conducted by audio and video. Individuals present during the telemedicine consultation include Joanna Gordon MA. Patient is at home and provider in clinic at time of visit.     Anayeli is being seen today for fatigue, body aches, cough, congestion, fever up to 100.5. Symptoms started yesterday morning. She has been taking Ibuprofen for symptoms.       Review of Systems      No Known Allergies   Past Medical History:   Diagnosis Date   • ADHD (attention deficit hyperactivity disorder) Unknown     Current Outpatient Medications   Medication Sig Dispense Refill   • Estarylla 0.25-35 MG-MCG per tablet Daily.     • lisdexamfetamine (VYVANSE) 40 MG capsule Take 1 capsule by mouth Daily As Needed     • ondansetron ODT (ZOFRAN-ODT) 4 MG disintegrating tablet Place 1 tablet on the tongue Every 8 (Eight) Hours As Needed for Nausea or Vomiting. 15 tablet 1     No current facility-administered medications for this visit.     History reviewed. No pertinent surgical history.   Social History     Tobacco Use   • Smoking status: Never     Passive exposure: Never   • Smokeless tobacco: Never   Vaping Use   • Vaping Use: Never used   Substance Use Topics   • Alcohol use: Not Currently   • Drug use: Never     Family History   Problem Relation Age of Onset   • Drug abuse Mother    • Mental illness Mother    • Cancer Maternal Grandmother    • Drug abuse Maternal Grandmother    • Alcohol abuse Paternal Grandmother    • Drug abuse Paternal Grandmother    • Mental illness Paternal Grandmother      Health Maintenance Due   Topic Date Due   • HPV VACCINES (3 - 3-dose series) 11/17/2020    • HEPATITIS C SCREENING  Never done   • ANNUAL PHYSICAL  Never done   • CHLAMYDIA SCREENING  Never done   • PAP SMEAR  Never done      Immunization History   Administered Date(s) Administered   • COVID-19 (WILMER) 09/10/2021   • COVID-19 (MODERNA) 1st, 2nd, 3rd Dose Only 12/01/2021   • DTaP 2002, 2002, 06/18/2003, 07/25/2006   • DTaP 5 2002, 2002, 2002, 06/18/2003, 07/25/2006   • Flublok 18+yrs 10/12/2020   • Hep A, 2 Dose 09/26/2013, 04/10/2018   • Hep B / HiB 2002, 03/28/2003   • Hep B, Adolescent or Pediatric 2002, 2002, 03/28/2003   • Hib (HbOC) 2002, 2002, 2002, 03/28/2003   • Hib (PRP-OMP) 2002   • Hpv9 04/24/2018, 07/17/2020   • IPV 2002, 2002, 2002, 07/25/2006   • MMR 06/18/2003, 07/25/2006   • Meningococcal Conjugate 09/26/2013, 04/10/2018   • Meningococcal MCV4P (Menactra) 09/26/2013, 04/10/2018   • PEDS-Pneumococcal Conjugate (PCV7) 2002, 2002, 2002, 06/15/2003   • Pneumococcal, Unspecified 2002, 2002, 2002, 06/15/2003   • Tdap 09/26/2013   • Trumenba(meningococcal B) 07/17/2020   • Varicella 03/28/2003, 09/26/2013        Objective     There were no vitals filed for this visit.  There is no height or weight on file to calculate BMI.     Physical Exam  Vitals reviewed.   Constitutional:       General: She is not in acute distress.     Appearance: Normal appearance. She is well-developed.   HENT:      Head: Normocephalic and atraumatic.   Pulmonary:      Effort: Pulmonary effort is normal.   Neurological:      Mental Status: She is alert and oriented to person, place, and time.   Psychiatric:         Mood and Affect: Mood and affect normal.           Result Review :                               Assessment and Plan      Diagnoses and all orders for this visit:    1. Viral infection (Primary)    Anayeli has a covid test at home that she will take. She is aware to quaratine herself per  CDC guidelines if positive. Declines to come to office for flu and/or covid testing. She will remain out of work today and tomorrow and the weekend. Will return if fever free for 24 hours. May need to be seen in office if not improving. Advised rest, fluids, Tylenol or Ibuprofen per package instructions. Work note for today and tomorrow.           Follow Up     Return for As needed for persistent or worsening symptoms.

## 2023-03-09 NOTE — TELEPHONE ENCOUNTER
Caller: Anayeli Lua    Relationship: Self    Best call back number: [phone]    What form or medical record are you requesting: WORK EXCUSE    Who is requesting this form or medical record from you: EMPLOYER    How would you like to receive the form or medical records (pick-up, mail, fax): PLEASE PUT IN MY CHART    Timeframe paperwork needed: AS SOON AS POSSIBLE    Additional notes: PATIENT CALLED AND CHANGED HER APPOINTMENT TO A MY CHART VIDEO VISIT THIS AFTERNOON. PATIENT WANTS TO MAKE SURE THAT SHE CAN STILL GET A WORK EXCUSE FOR TODAY SENT TO HER MY CHART?

## 2023-03-14 ENCOUNTER — TELEMEDICINE (OUTPATIENT)
Dept: FAMILY MEDICINE CLINIC | Age: 23
End: 2023-03-14
Payer: COMMERCIAL

## 2023-03-14 DIAGNOSIS — R10.84 GENERALIZED ABDOMINAL PAIN: Primary | ICD-10-CM

## 2023-03-14 DIAGNOSIS — R10.2 PELVIC PAIN: ICD-10-CM

## 2023-03-14 DIAGNOSIS — Z87.42 HX OF OVARIAN CYST: ICD-10-CM

## 2023-03-14 PROCEDURE — 99213 OFFICE O/P EST LOW 20 MIN: CPT | Performed by: NURSE PRACTITIONER

## 2023-03-14 NOTE — ASSESSMENT & PLAN NOTE
In person visits for evaluation of abdominal pain are  preferred and recommended   In person exam will  produce more accuracy and assessment and evaluation.  It is very difficult to assess abdominal pain by televideo visit.  Recommend acute abdominal pain work-up with blood and urine testing and ultrasound imaging.  To emergency room if worsens.  See gynecologist as scheduled.  Further treatment pending lab results.

## 2023-03-15 ENCOUNTER — TELEPHONE (OUTPATIENT)
Dept: FAMILY MEDICINE CLINIC | Age: 23
End: 2023-03-15

## 2023-03-15 NOTE — TELEPHONE ENCOUNTER
Caller: Anayeli Lua    Relationship: Self    Best call back number:[phone]    What form or medical record are you requesting: WORK NOTE     Who is requesting this form or medical record from you: PATIENT     How would you like to receive the form or medical records (pick-up, mail, fax): PLEASE UPLOAD TO MY CHART     Timeframe paperwork needed: AS SOON AS POSSIBLE.     Additional notes: NEEDS WORK NOTE FOR Monday AND Tuesday

## 2023-03-20 ENCOUNTER — TELEPHONE (OUTPATIENT)
Dept: FAMILY MEDICINE CLINIC | Age: 23
End: 2023-03-20
Payer: COMMERCIAL

## 2023-03-20 NOTE — TELEPHONE ENCOUNTER
----- Message from Clarissa Mahoney RN sent at 3/20/2023  8:36 AM EDT -----      ----- Message -----  From: SYSTEM  Sent: 3/19/2023   1:21 AM EDT  To: [facility] [place] [facility] [place]

## 2023-07-27 ENCOUNTER — OFFICE VISIT (OUTPATIENT)
Dept: FAMILY MEDICINE CLINIC | Age: 23
End: 2023-07-27
Payer: COMMERCIAL

## 2023-07-27 VITALS
SYSTOLIC BLOOD PRESSURE: 116 MMHG | BODY MASS INDEX: 20.76 KG/M2 | OXYGEN SATURATION: 100 % | TEMPERATURE: 97.9 F | HEIGHT: 68 IN | DIASTOLIC BLOOD PRESSURE: 72 MMHG | HEART RATE: 77 BPM | WEIGHT: 137 LBS

## 2023-07-27 DIAGNOSIS — H61.23 IMPACTED CERUMEN OF BOTH EARS: Primary | ICD-10-CM

## 2023-07-27 NOTE — PROGRESS NOTES
"Chief Complaint  Earache (X 5 day, left ear pain and having a hard time hearing )    Subjective          Anayeli Lua presents to [facility] for left ear \"pressure\".  She has had some mild hearing loss.  She has had some pain down her left neck and jaw.  Denies fever, chills, nausea vomiting, diarrhea, shortness of air or chest pain.  States she does have intermittent sore throat on her left side at times.  No known ill contacts.      Objective   Vital Signs:   Vitals:    07/27/23 1253   BP: 116/72   BP Location: Right arm   Patient Position: Sitting   Cuff Size: Adult   Pulse: 77   Temp: 97.9 °F (36.6 °C)   TempSrc: Oral   SpO2: 100%   Weight: 62.1 kg (137 lb)   Height: 172.7 cm (67.99\")       Physical Exam  Vitals reviewed.   Constitutional:       General: She is not in acute distress.     Appearance: Normal appearance. She is well-developed.   HENT:      Head: Normocephalic and atraumatic.      Right Ear: There is impacted cerumen.      Left Ear: There is impacted cerumen.   Eyes:      Conjunctiva/sclera: Conjunctivae normal.      Pupils: Pupils are equal, round, and reactive to light.   Cardiovascular:      Rate and Rhythm: Normal rate and regular rhythm.      Heart sounds: Normal heart sounds. No murmur heard.  Pulmonary:      Effort: Pulmonary effort is normal. No respiratory distress.      Breath sounds: Normal breath sounds.   Skin:     General: Skin is warm and dry.   Neurological:      Mental Status: She is alert and oriented to person, place, and time.   Psychiatric:         Mood and Affect: Mood and affect normal.         Behavior: Behavior normal.         Thought Content: Thought content normal.         Judgment: Judgment normal.        Result Review :              Assessment and Plan    Diagnoses and all orders for this visit:    1. Impacted cerumen of both ears (Primary)  Comments:  Patient to use Debrox into left ear 3 times a day and return to office early next week " for repeat irrigation.  Notify office if pain worsens.  Orders:  -     carbamide peroxide (Debrox) 6.5 % otic solution; Administer 5 drops into the left ear 2 (Two) Times a Day.  Dispense: 15 mL; Refill: 0    Other orders  -     Ear Cerumen Removal    Patient to notify office with any acute concerns or issues.  Patient verbalizes understanding, agrees with plan of care and has no further questions upon discharge.    Please note that portions of this note were completed with a voice recognition program.    Follow Up    Return in about 1 week (around 8/3/2023) for Recheck.  Patient was given instructions and counseling regarding her condition or for health maintenance advice. Please see specific information pulled into the AVS if appropriate.

## 2023-07-27 NOTE — PROGRESS NOTES
Ear Cerumen Removal    Date/Time: 7/27/2023 2:49 PM  Performed by: Estella Medina MA  Authorized by: Chula Bernardo APRN     Anesthesia:  Local Anesthetic: none  Location details: left ear and right ear  Patient tolerance: patient tolerated the procedure well with no immediate complications  Comments: Ear drums visible, patient tolerated well  Procedure type: irrigation   Sedation:  Patient sedated: no

## 2023-08-01 ENCOUNTER — TELEPHONE (OUTPATIENT)
Dept: FAMILY MEDICINE CLINIC | Age: 23
End: 2023-08-01

## 2024-01-30 ENCOUNTER — TELEPHONE (OUTPATIENT)
Dept: FAMILY MEDICINE CLINIC | Age: 24
End: 2024-01-30
Payer: COMMERCIAL

## 2024-06-15 NOTE — PROGRESS NOTES
Chief Complaint  Abdominal Pain (Patient complains of possible left ovary cyst rupture yesterday with spotting, states she has GYN appointment next week. //Video visit - [phone])    Subjective          Anayeli Lua presents to [facility]     Patient is a 23-year-old female who provides consent for televideo visit today.  Both audio and video are utilized.  No other persons are present for the visit.  This is my first time meeting patient today but she identifies herself with name and date of birth.  Patient reports that she thinks she may have had a left ovarian cyst rupture Sunday night.  1 month ago was diagnosed with a right ovarian cyst.  Will be seen gynecologist next week.  Last regular menstrual cycle was about 1 month ago.  States another provider started her on birth control pills since that time and has had some mild spotting.  Symptoms are much better today but she missed work yesterday and today.  She also reports a more generalized discomfort of her abdomen today without fever, nausea, vomiting, diarrhea, constipation, or any dysuria.  She works for [employer] and has transportation.  She is currently near her college campus at [employer] [facility].  She states she is unable to come for an appointment or have labs done today.  Denies any prior history of ovarian cyst.     Objective   Vital Signs:   There were no vitals filed for this visit.   There is no height or weight on file to calculate BMI.  Physical Exam  Constitutional:       General: She is not in acute distress.     Appearance: Normal appearance. She is not ill-appearing.   Neurological:      General: No focal deficit present.      Mental Status: She is alert.   Psychiatric:         Mood and Affect: Mood normal.         Behavior: Behavior normal.         Thought Content: Thought content normal.         Judgment: Judgment normal.           Current Outpatient Medications:   •  Estarylla 0.25-35  MG-MCG per tablet, Daily., Disp: , Rfl:   •  lisdexamfetamine (VYVANSE) 40 MG capsule, Take 1 capsule by mouth Daily As Needed, Disp: , Rfl:   •  ondansetron ODT (ZOFRAN-ODT) 4 MG disintegrating tablet, Place 1 tablet on the tongue Every 8 (Eight) Hours As Needed for Nausea or Vomiting., Disp: 15 tablet, Rfl: 1   Past Medical History:   Diagnosis Date   • ADHD (attention deficit hyperactivity disorder) Unknown         Result Review :         CBC    CBC 2/13/23   WBC 9.74   RBC 4.75   Hemoglobin 13.8   Hematocrit 41.7   MCV 87.8   MCH 29.1   MCHC 33.1   RDW 11.9 (A)   Platelets 412   (A) Abnormal value            CBC w/diff    CBC w/Diff 2/13/23   WBC 9.74   RBC 4.75   Hemoglobin 13.8   Hematocrit 41.7   MCV 87.8   MCH 29.1   MCHC 33.1   RDW 11.9 (A)   Platelets 412   Neutrophil Rel % 59.3   Immature Granulocyte Rel % 0.2   Lymphocyte Rel % 33.4   Monocyte Rel % 6.0   Eosinophil Rel % 0.6   Basophil Rel % 0.5   (A) Abnormal value                                 Assessment and Plan    Diagnoses and all orders for this visit:    1. Generalized abdominal pain (Primary)  Assessment & Plan:  In person visits for evaluation of abdominal pain are  preferred and recommended   In person exam will  produce more accuracy and assessment and evaluation.  It is very difficult to assess abdominal pain by televideo visit.  Recommend acute abdominal pain work-up with blood and urine testing and ultrasound imaging.  To emergency room if worsens.  See gynecologist as scheduled.  Further treatment pending lab results.    Orders:  -     US Abdomen Complete; Future  -     CBC w AUTO Differential; Future  -     Comprehensive metabolic panel; Future  -     Urinalysis With Culture If Indicated -; Future  -     Amylase; Future  -     Lipase; Future    2. Pelvic pain  -     US Pelvis Transvaginal Non OB; Future    3. Hx of ovarian cyst  -     US Pelvis Transvaginal Non OB; Future      Follow Up    No follow-ups on file.  Patient was given  No - the patient is unable to be screened due to medical condition instructions and counseling regarding her condition or for health maintenance advice. Please see specific information pulled into the AVS if appropriate.